# Patient Record
Sex: FEMALE | Race: OTHER | Employment: PART TIME | URBAN - METROPOLITAN AREA
[De-identification: names, ages, dates, MRNs, and addresses within clinical notes are randomized per-mention and may not be internally consistent; named-entity substitution may affect disease eponyms.]

---

## 2023-01-31 ENCOUNTER — APPOINTMENT (OUTPATIENT)
Dept: CT IMAGING | Age: 38
End: 2023-01-31
Attending: EMERGENCY MEDICINE
Payer: COMMERCIAL

## 2023-01-31 ENCOUNTER — HOSPITAL ENCOUNTER (EMERGENCY)
Age: 38
Discharge: HOME | End: 2023-01-31
Attending: EMERGENCY MEDICINE
Payer: COMMERCIAL

## 2023-01-31 VITALS
DIASTOLIC BLOOD PRESSURE: 67 MMHG | TEMPERATURE: 98.4 F | SYSTOLIC BLOOD PRESSURE: 101 MMHG | HEIGHT: 63 IN | RESPIRATION RATE: 16 BRPM | OXYGEN SATURATION: 99 % | WEIGHT: 144 LBS | BODY MASS INDEX: 25.52 KG/M2 | HEART RATE: 62 BPM

## 2023-01-31 DIAGNOSIS — R51.9 ACUTE NONINTRACTABLE HEADACHE, UNSPECIFIED HEADACHE TYPE: Primary | ICD-10-CM

## 2023-01-31 LAB — HCG UR QL: NEGATIVE

## 2023-01-31 PROCEDURE — 74011250636 HC RX REV CODE- 250/636: Performed by: EMERGENCY MEDICINE

## 2023-01-31 PROCEDURE — 96375 TX/PRO/DX INJ NEW DRUG ADDON: CPT

## 2023-01-31 PROCEDURE — 70450 CT HEAD/BRAIN W/O DYE: CPT

## 2023-01-31 PROCEDURE — 99284 EMERGENCY DEPT VISIT MOD MDM: CPT

## 2023-01-31 PROCEDURE — 96374 THER/PROPH/DIAG INJ IV PUSH: CPT

## 2023-01-31 RX ORDER — DEXAMETHASONE SODIUM PHOSPHATE 10 MG/ML
10 INJECTION INTRAMUSCULAR; INTRAVENOUS ONCE
Status: COMPLETED | OUTPATIENT
Start: 2023-01-31 | End: 2023-01-31

## 2023-01-31 RX ORDER — KETOROLAC TROMETHAMINE 30 MG/ML
15 INJECTION, SOLUTION INTRAMUSCULAR; INTRAVENOUS
Status: COMPLETED | OUTPATIENT
Start: 2023-01-31 | End: 2023-01-31

## 2023-01-31 RX ORDER — PROCHLORPERAZINE EDISYLATE 5 MG/ML
10 INJECTION INTRAMUSCULAR; INTRAVENOUS
Status: COMPLETED | OUTPATIENT
Start: 2023-01-31 | End: 2023-01-31

## 2023-01-31 RX ORDER — DIPHENHYDRAMINE HYDROCHLORIDE 50 MG/ML
12.5 INJECTION, SOLUTION INTRAMUSCULAR; INTRAVENOUS ONCE
Status: COMPLETED | OUTPATIENT
Start: 2023-01-31 | End: 2023-01-31

## 2023-01-31 RX ADMIN — DEXAMETHASONE SODIUM PHOSPHATE 10 MG: 10 INJECTION, SOLUTION INTRAMUSCULAR; INTRAVENOUS at 17:25

## 2023-01-31 RX ADMIN — PROCHLORPERAZINE EDISYLATE 10 MG: 5 INJECTION INTRAMUSCULAR; INTRAVENOUS at 17:25

## 2023-01-31 RX ADMIN — DIPHENHYDRAMINE HYDROCHLORIDE 12.5 MG: 50 INJECTION, SOLUTION INTRAMUSCULAR; INTRAVENOUS at 17:22

## 2023-01-31 RX ADMIN — SODIUM CHLORIDE 1000 ML: 9 INJECTION, SOLUTION INTRAVENOUS at 17:29

## 2023-01-31 RX ADMIN — KETOROLAC TROMETHAMINE 15 MG: 30 INJECTION, SOLUTION INTRAMUSCULAR; INTRAVENOUS at 17:25

## 2023-01-31 NOTE — ED PROVIDER NOTES
Please note that this dictation was completed with [employer], the the grafter voice recognition software. Quite often unanticipated grammatical, syntax, homophones, and other interpretive errors are inadvertently transcribed by the computer software. Please disregard these errors. Please excuse any errors that have escaped final proofreading. Patient is a 29-year-old otherwise healthy female presenting to ED for evaluation of a headache with onset 4 days ago. States that the headache began while she was having sexual intercourse, she took ibuprofen afterwards and pain resolved after about 10 to 15 minutes. She states that since then the pain has been intermittent and she has not had much relief with Motrin or Tylenol. She has had history of headaches but feels like her pain is more severe than usual and is lasting longer. She denies dizziness, vision changes, numbness, tingling, speech changes, trouble walking, weakness, or any additional medical complaints at this time. History reviewed. No pertinent past medical history. History reviewed. No pertinent surgical history. History reviewed. No pertinent family history.     Social History     Socioeconomic History    Marital status: SINGLE     Spouse name: Not on file    Number of children: Not on file    Years of education: Not on file    Highest education level: Not on file   Occupational History    Not on file   Tobacco Use    Smoking status: Never    Smokeless tobacco: Never   Substance and Sexual Activity    Alcohol use: Yes     Comment: Weekends    Drug use: Never    Sexual activity: Not on file   Other Topics Concern    Not on file   Social History Narrative    Not on file     Social Determinants of Health     Financial Resource Strain: Not on file   Food Insecurity: Not on file   Transportation Needs: Not on file   Physical Activity: Not on file   Stress: Not on file   Social Connections: Not on file   Intimate Partner Violence: Not on file Housing Stability: Not on file         ALLERGIES: Patient has no known allergies. Review of Systems   Constitutional:  Negative for fever. HENT:  Negative for congestion and sore throat. Eyes:  Negative for visual disturbance. Respiratory:  Negative for cough and shortness of breath. Cardiovascular:  Negative for chest pain. Gastrointestinal:  Negative for abdominal pain, diarrhea, nausea and vomiting. Genitourinary:  Negative for dysuria. Musculoskeletal:  Negative for back pain. Skin:  Negative for color change. Neurological:  Positive for headaches. Negative for dizziness. Psychiatric/Behavioral:  Negative for confusion. Vitals:    01/31/23 1629   BP: 120/79   Pulse: 73   Resp: 16   Temp: 98.9 °F (37.2 °C)   SpO2: 99%   Weight: 65.3 kg (144 lb)   Height: 5' 3\" (1.6 m)            Physical Exam  Vitals and nursing note reviewed. Constitutional:       General: She is not in acute distress. Appearance: Normal appearance. She is not ill-appearing. HENT:      Head: Normocephalic and atraumatic. Eyes:      General: Vision grossly intact. Extraocular Movements: Extraocular movements intact. Conjunctiva/sclera: Conjunctivae normal.   Neck:      Trachea: Phonation normal.   Cardiovascular:      Rate and Rhythm: Normal rate and regular rhythm. Heart sounds: Normal heart sounds. Pulmonary:      Effort: Pulmonary effort is normal.      Breath sounds: Normal breath sounds. Abdominal:      Palpations: Abdomen is soft. Tenderness: There is no abdominal tenderness. Musculoskeletal:         General: Normal range of motion. Cervical back: Normal range of motion. Skin:     General: Skin is warm and dry. Neurological:      General: No focal deficit present. Mental Status: She is alert and oriented to person, place, and time. Cranial Nerves: Cranial nerves 2-12 are intact. Sensory: Sensation is intact. Motor: Motor function is intact. Coordination: Coordination is intact. Gait: Gait is intact. Medical Decision Making  This is a 29 yo F who presents to the emergency room ambulatory with stable vitals signs with complaints of headache x 4 days. I personally reviewed any available previous notes and chronic medical issues  Differential Diagnoses: Migraine, tension, cluster, sinus disease, dehydration, visual disturbance, viral illness. Less likely SAH. On physical exam she is well-appearing. No focal neurologic deficits on exam.  I ordered and interpreted the following tests: head CT unremarkable   Interventions and Plan: Given migraine cocktail with relief in pain. Will recommend PCP and neurology follow-up as needed. Amount and/or Complexity of Data Reviewed  Labs: ordered. Radiology: ordered. Decision-making details documented in ED Course. Risk  Prescription drug management. ED Course as of 01/31/23 1747   Tue Jan 31, 2023   1715 CT HEAD WO CONT  IMPRESSION     Normal study [EP]      ED Course User Index  [EP] Shavon Krishnamurthy PA     6:13 PM  Pt has been reevaluated. There are no new complaints, changes, or physical findings at this time. All results have been reviewed with patient and/or family. Medications have been reviewed w/ pt and/or family. Pt and/or family's questions have been answered. Pt and/or family expressed good understanding of the dx/tx/rx and is in agreement with plan of care. Pt instructed and agreed to f/u w/ PCP, neurology and to return to ED upon further deterioration. Return precautions outlined. All questions answered at this time. Pt is stable and ready for discharge. IMPRESSION:  1. Acute nonintractable headache, unspecified headache type        PLAN:  1. There are no discharge medications for this patient.     2.   Follow-up Information       Follow up With Specialties Details Why Contact Info    Your Primary Care Provider  Schedule an appointment as soon as possible for a visit [address] [facility]  Schedule an appointment as soon as possible for a visit   [address] [facility] [address]  [phone]              Return to ED if worse     Procedures

## 2023-01-31 NOTE — ED NOTES
Pt discharged ambulatory with family. NAD. States HA is gone. Voiced understanding of dc instruc and follow up. VSS.

## 2023-01-31 NOTE — ED TRIAGE NOTES
Pt ambulatory into ER with steady gait accompanied by Sister with cc of left and posterior HA since Saturday night that began while patient was [place] sex and about to orgasm. \" Pt denies nausea, visual disturbances, photosensitivity, or sensitivity to noise. Pt reports taking 600mg motrin today at 1200 and 1000mg tylenol PTA today.